# Patient Record
Sex: FEMALE | Race: WHITE | NOT HISPANIC OR LATINO
[De-identification: names, ages, dates, MRNs, and addresses within clinical notes are randomized per-mention and may not be internally consistent; named-entity substitution may affect disease eponyms.]

---

## 2017-01-20 ENCOUNTER — CHARTING TRANS (OUTPATIENT)
Dept: OTHER | Age: 32
End: 2017-01-20

## 2017-01-23 ENCOUNTER — CHARTING TRANS (OUTPATIENT)
Dept: OTHER | Age: 32
End: 2017-01-23

## 2017-04-05 ENCOUNTER — TELEPHONE (OUTPATIENT)
Dept: FAMILY MEDICINE CLINIC | Facility: CLINIC | Age: 32
End: 2017-04-05

## 2017-04-05 RX ORDER — FLUOXETINE HYDROCHLORIDE 20 MG/1
20 CAPSULE ORAL DAILY
Qty: 30 CAPSULE | Refills: 3 | Status: SHIPPED | OUTPATIENT
Start: 2017-04-05 | End: 2017-04-17

## 2017-04-05 NOTE — TELEPHONE ENCOUNTER
Pt informed of Dr Jeanmarie Medel' response below; pt thankful and agrees. Will take fluoxetine as prescribed and f/u 5/15/17 as previously scheduled; will call sooner if any issues with this medication or if no improvement.  Denies further questions/concerns at this ti

## 2017-04-05 NOTE — TELEPHONE ENCOUNTER
Of course we can do that. I will send that into the pharmacy now. Stop the sertraline immediately and start fluoxetine.

## 2017-04-05 NOTE — TELEPHONE ENCOUNTER
Pt reports taking Sertraline since prescribed 3/27/17 by VS at anxiety OV. Reports noticed anxiety increasing since starting even just the half dose of sertraline for a week.  Also having nightmares about dying; has been crying and having panic attacks (tres

## 2017-04-05 NOTE — TELEPHONE ENCOUNTER
Jacek Barrientos is home today because the Sertraline is making her much more anxious, having nightmares, crying, panic attacks. She would like to have Fluoxetine HCL 20mg, her sibling is on that medication.  Please advise      Current Outpatient Prescriptions:  Sertr

## 2017-04-17 ENCOUNTER — TELEPHONE (OUTPATIENT)
Dept: FAMILY MEDICINE CLINIC | Facility: CLINIC | Age: 32
End: 2017-04-17

## 2017-04-17 RX ORDER — FLUOXETINE HYDROCHLORIDE 20 MG/1
20 CAPSULE ORAL 2 TIMES DAILY
Qty: 60 CAPSULE | Refills: 3 | Status: SHIPPED | OUTPATIENT
Start: 2017-04-17 | End: 2017-05-15

## 2017-04-17 NOTE — TELEPHONE ENCOUNTER
Pt stated that for a couple day now she has been taking Fluoxetine 20 mg BID and she feels so much better. In the morning it helps her get through the day and in the evening it helps her relax. Can it be changed to BID?

## 2017-04-17 NOTE — TELEPHONE ENCOUNTER
Pt informed Dr Persaud Seen went ahead and sent rx with BID dosing as requested. Denies further questions/concerns at this time.

## 2017-04-17 NOTE — TELEPHONE ENCOUNTER
Pt calling states she feels her current med dosage is not helping her feel better. Pt requesting to double dosage so she can take twice daily. Pt states she has been taking current med this way and feels better. Please advise.        Current Outpatient Pres

## 2017-04-21 ENCOUNTER — TELEPHONE (OUTPATIENT)
Dept: FAMILY MEDICINE CLINIC | Facility: CLINIC | Age: 32
End: 2017-04-21

## 2017-04-21 NOTE — TELEPHONE ENCOUNTER
Per pt, she increase the dose in taking FLUoxetine HCl 20 MG Oral Cap ahead of time before that's why pt is short now in medicine. When pt went and  her pharmacy stts that it is too soon to  and they told her that it is due on 05/04/2017.   P

## 2017-04-21 NOTE — TELEPHONE ENCOUNTER
Patient was left a detail message on personal voicemail. I will call [facility] to have new rx filled dated 4/17/17; I spoke to pharmacist; will run the claim for 4/17/17 rx. Explained that new SIG. Patient is to take BID.

## 2017-04-21 NOTE — TELEPHONE ENCOUNTER
My understanding is that fluoxetine which is Prozac is a capsule so she cannot cut it in half. I sent 20 mg to take twice daily #60 just recently. She should have more than enough as this was a new prescription.

## 2017-05-15 PROBLEM — F41.9 ANXIETY: Status: ACTIVE | Noted: 2017-05-15

## 2017-05-15 NOTE — PROGRESS NOTES
Patient ID: Jonathan Bolton is a 36year old female. HPI  Patient presents with: Anxiety: follow up     We switched her from sertraline to Prozac as family members were on Prozac and it helped them.   She also started taking 2 a day which made her feel be breastfeeding. Physical Exam   Constitutional: Patient is oriented to person, place, and time. Patient appears well-developed and well-nourished. No distress. HENT:   Head: Normocephalic and atraumatic. Neck: Normal range of motion. Neck supple.  No th

## 2017-08-14 PROBLEM — E66.01 SEVERE OBESITY (BMI 35.0-39.9): Status: ACTIVE | Noted: 2017-08-14

## 2017-08-14 NOTE — PROGRESS NOTES
Patient ID: Roddy Skaggs is a 36year old female. 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 She is on Prozac twice daily. She is not depressed.   She states she is doing wonderful with the [employer] person, place, and time. Patient appears well-developed and well-nourished. No distress. HENT:   Head: Normocephalic and atraumatic. Neck: Normal range of motion. Neck supple. No thyromegaly present. Lymphadenopathy:     Has  no cervical adenopathy.

## 2017-12-11 ENCOUNTER — OFFICE VISIT (OUTPATIENT)
Dept: FAMILY MEDICINE CLINIC | Facility: CLINIC | Age: 32
End: 2017-12-11

## 2017-12-11 VITALS
WEIGHT: 154.19 LBS | RESPIRATION RATE: 12 BRPM | BODY MASS INDEX: 32.37 KG/M2 | TEMPERATURE: 98 F | HEART RATE: 92 BPM | DIASTOLIC BLOOD PRESSURE: 74 MMHG | SYSTOLIC BLOOD PRESSURE: 122 MMHG | HEIGHT: 58 IN

## 2017-12-11 DIAGNOSIS — Z12.4 CERVICAL CANCER SCREENING: ICD-10-CM

## 2017-12-11 DIAGNOSIS — R63.4 WEIGHT LOSS: ICD-10-CM

## 2017-12-11 DIAGNOSIS — F41.9 ANXIETY: ICD-10-CM

## 2017-12-11 DIAGNOSIS — Z00.00 ADULT GENERAL MEDICAL EXAM: Primary | ICD-10-CM

## 2017-12-11 PROCEDURE — 99395 PREV VISIT EST AGE 18-39: CPT | Performed by: FAMILY MEDICINE

## 2017-12-11 RX ORDER — FLUOXETINE HYDROCHLORIDE 20 MG/1
20 CAPSULE ORAL 2 TIMES DAILY
Qty: 180 CAPSULE | Refills: 3 | Status: SHIPPED | OUTPATIENT
Start: 2017-12-11 | End: 2018-12-18

## 2017-12-11 NOTE — PROGRESS NOTES
Patient ID: Regina Marcelino is a 29year old female. HPI  Patient presents with:  Routine Physical    She teaches sixth and eighth grade. She does not smoke. She has a boyfriend now for 2 months. She is long overdue to see her gynecologist Dr. Rohini Tejeda. for adenopathy. Does not bruise/bleed easily. Psychiatric/Behavioral: Negative for dysphoric mood and hallucinations. The patient is not nervous/anxious.           Past Medical History:   Diagnosis Date   • Cholelithiasis     per NG: laproscopic cholecyst and breath sounds normal. No respiratory distress. Abdominal: Soft. Bowel sounds are normal. There is no hepatosplenomegaly. There is no tenderness. Lymphadenopathy:     She has no  cervical adenopathy.    Neurological: She is alert and oriented to pers understands and agrees to plan.      REFUSES FLU SHOT      [facility], DO  12/11/2017

## 2018-01-03 ENCOUNTER — OFFICE VISIT (OUTPATIENT)
Dept: OBGYN CLINIC | Facility: CLINIC | Age: 33
End: 2018-01-03

## 2018-01-03 VITALS
HEART RATE: 98 BPM | BODY MASS INDEX: 33.17 KG/M2 | HEIGHT: 57.75 IN | WEIGHT: 158 LBS | SYSTOLIC BLOOD PRESSURE: 112 MMHG | DIASTOLIC BLOOD PRESSURE: 82 MMHG

## 2018-01-03 DIAGNOSIS — Z12.4 SCREENING FOR MALIGNANT NEOPLASM OF CERVIX: ICD-10-CM

## 2018-01-03 DIAGNOSIS — Z01.419 WELL WOMAN EXAM WITH ROUTINE GYNECOLOGICAL EXAM: Primary | ICD-10-CM

## 2018-01-03 DIAGNOSIS — Z76.0 MEDICATION REFILL: ICD-10-CM

## 2018-01-03 PROCEDURE — 99385 PREV VISIT NEW AGE 18-39: CPT | Performed by: CLINICAL NURSE SPECIALIST

## 2018-01-03 RX ORDER — DROSPIRENONE AND ETHINYL ESTRADIOL 0.02-3(28)
1 KIT ORAL DAILY
Qty: 1 PACKAGE | Refills: 11 | Status: SHIPPED | OUTPATIENT
Start: 2018-01-03 | End: 2019-01-21

## 2018-01-03 NOTE — PROGRESS NOTES
Olga Saucedo is a 29year old female  Patient's last menstrual period was 2017. Patient presents with:  Gyn Exam: annual  Last annual exam was  with CAP. Last pap was  and normal Denies hx of abnormal pap's.  Stopped OCP a few years ago Disease Maternal Grandmother      per NG: kidney failure   • Heart Disease Paternal Grandmother    • Kidney Disease Sister      per NG: kidney stones   • Breast Cancer Other      per NG: mom's cousin       MEDICATIONS:    Current Outpatient Prescriptions: location, without lesions and prolapse  Bladder:  No fullness, masses or tenderness  Vagina:  Normal appearance without lesions, no abnormal discharge  Cervix:  Normal without tenderness on motion  Uterus: normal in size, contour, position, mobility, witho

## 2018-01-04 LAB — HPV I/H RISK 1 DNA SPEC QL NAA+PROBE: NEGATIVE

## 2018-01-06 ENCOUNTER — LAB ENCOUNTER (OUTPATIENT)
Dept: LAB | Facility: HOSPITAL | Age: 33
End: 2018-01-06
Attending: FAMILY MEDICINE
Payer: COMMERCIAL

## 2018-01-06 DIAGNOSIS — R63.4 WEIGHT LOSS: ICD-10-CM

## 2018-01-06 DIAGNOSIS — Z00.00 ADULT GENERAL MEDICAL EXAM: ICD-10-CM

## 2018-01-06 LAB
ALBUMIN SERPL BCP-MCNC: 3.8 G/DL (ref 3.5–4.8)
ALBUMIN/GLOB SERPL: 1.4 {RATIO} (ref 1–2)
ALP SERPL-CCNC: 51 U/L (ref 32–100)
ALT SERPL-CCNC: 19 U/L (ref 14–54)
ANION GAP SERPL CALC-SCNC: 7 MMOL/L (ref 0–18)
AST SERPL-CCNC: 15 U/L (ref 15–41)
BASOPHILS # BLD: 0 K/UL (ref 0–0.2)
BASOPHILS NFR BLD: 0 %
BILIRUB SERPL-MCNC: 0.6 MG/DL (ref 0.3–1.2)
BUN SERPL-MCNC: 12 MG/DL (ref 8–20)
BUN/CREAT SERPL: 20 (ref 10–20)
CALCIUM SERPL-MCNC: 8.9 MG/DL (ref 8.5–10.5)
CHLORIDE SERPL-SCNC: 102 MMOL/L (ref 95–110)
CHOLEST SERPL-MCNC: 164 MG/DL (ref 110–200)
CO2 SERPL-SCNC: 26 MMOL/L (ref 22–32)
CREAT SERPL-MCNC: 0.6 MG/DL (ref 0.5–1.5)
EOSINOPHIL # BLD: 0.1 K/UL (ref 0–0.7)
EOSINOPHIL NFR BLD: 1 %
ERYTHROCYTE [DISTWIDTH] IN BLOOD BY AUTOMATED COUNT: 14.4 % (ref 11–15)
GLOBULIN PLAS-MCNC: 2.8 G/DL (ref 2.5–3.7)
GLUCOSE SERPL-MCNC: 86 MG/DL (ref 70–99)
HCT VFR BLD AUTO: 41.7 % (ref 35–48)
HDLC SERPL-MCNC: 49 MG/DL
HGB BLD-MCNC: 13.7 G/DL (ref 12–16)
LDLC SERPL CALC-MCNC: 87 MG/DL (ref 0–99)
LYMPHOCYTES # BLD: 3.9 K/UL (ref 1–4)
LYMPHOCYTES NFR BLD: 40 %
MCH RBC QN AUTO: 28.9 PG (ref 27–32)
MCHC RBC AUTO-ENTMCNC: 33 G/DL (ref 32–37)
MCV RBC AUTO: 87.6 FL (ref 80–100)
MONOCYTES # BLD: 0.7 K/UL (ref 0–1)
MONOCYTES NFR BLD: 7 %
NEUTROPHILS # BLD AUTO: 5 K/UL (ref 1.8–7.7)
NEUTROPHILS NFR BLD: 52 %
NONHDLC SERPL-MCNC: 115 MG/DL
OSMOLALITY UR CALC.SUM OF ELEC: 279 MOSM/KG (ref 275–295)
PLATELET # BLD AUTO: 382 K/UL (ref 140–400)
PMV BLD AUTO: 6.8 FL (ref 7.4–10.3)
POTASSIUM SERPL-SCNC: 4.2 MMOL/L (ref 3.3–5.1)
PROT SERPL-MCNC: 6.6 G/DL (ref 5.9–8.4)
RBC # BLD AUTO: 4.75 M/UL (ref 3.7–5.4)
SODIUM SERPL-SCNC: 135 MMOL/L (ref 136–144)
TRIGL SERPL-MCNC: 141 MG/DL (ref 1–149)
TSH SERPL-ACNC: 2.4 UIU/ML (ref 0.45–5.33)
VIT B12 SERPL-MCNC: 638 PG/ML (ref 181–914)
WBC # BLD AUTO: 9.8 K/UL (ref 4–11)

## 2018-01-06 PROCEDURE — 36415 COLL VENOUS BLD VENIPUNCTURE: CPT

## 2018-01-06 PROCEDURE — 85025 COMPLETE CBC W/AUTO DIFF WBC: CPT

## 2018-01-06 PROCEDURE — 80061 LIPID PANEL: CPT

## 2018-01-06 PROCEDURE — 80053 COMPREHEN METABOLIC PANEL: CPT

## 2018-01-06 PROCEDURE — 82607 VITAMIN B-12: CPT

## 2018-01-06 PROCEDURE — 82306 VITAMIN D 25 HYDROXY: CPT

## 2018-01-06 PROCEDURE — 84443 ASSAY THYROID STIM HORMONE: CPT

## 2018-01-10 LAB — 25(OH)D3 SERPL-MCNC: 32.4 NG/ML

## 2018-12-18 DIAGNOSIS — F41.9 ANXIETY: ICD-10-CM

## 2018-12-26 NOTE — TELEPHONE ENCOUNTER
Requested Prescriptions     Pending Prescriptions Disp Refills   • FLUoxetine HCl 20 MG Oral Cap 180 capsule 3     Sig: Take 1 capsule (20 mg total) by mouth 2 (two) times daily.        Last Office Visit with PCP: 12/11/17  Last Blood Pressures:  BP Reading

## 2018-12-28 RX ORDER — FLUOXETINE HYDROCHLORIDE 20 MG/1
20 CAPSULE ORAL 2 TIMES DAILY
Qty: 180 CAPSULE | Refills: 0 | Status: SHIPPED | OUTPATIENT
Start: 2018-12-28 | End: 2019-01-21

## 2019-01-21 ENCOUNTER — APPOINTMENT (OUTPATIENT)
Dept: LAB | Age: 34
End: 2019-01-21
Attending: FAMILY MEDICINE
Payer: COMMERCIAL

## 2019-01-21 DIAGNOSIS — R06.83 SNORING: ICD-10-CM

## 2019-01-21 DIAGNOSIS — E66.01 MORBID OBESITY DUE TO EXCESS CALORIES (HCC): ICD-10-CM

## 2019-01-21 PROCEDURE — 93010 ELECTROCARDIOGRAM REPORT: CPT | Performed by: FAMILY MEDICINE

## 2019-01-21 PROCEDURE — 93005 ELECTROCARDIOGRAM TRACING: CPT

## 2019-03-04 ENCOUNTER — OFFICE VISIT (OUTPATIENT)
Dept: FAMILY MEDICINE CLINIC | Facility: CLINIC | Age: 34
End: 2019-03-04
Payer: COMMERCIAL

## 2019-03-04 VITALS
TEMPERATURE: 99 F | RESPIRATION RATE: 18 BRPM | DIASTOLIC BLOOD PRESSURE: 80 MMHG | BODY MASS INDEX: 47.86 KG/M2 | HEART RATE: 88 BPM | SYSTOLIC BLOOD PRESSURE: 118 MMHG | WEIGHT: 228 LBS | HEIGHT: 58 IN

## 2019-03-04 DIAGNOSIS — E28.2 POLYCYSTIC OVARIES: ICD-10-CM

## 2019-03-04 DIAGNOSIS — F41.9 ANXIETY: ICD-10-CM

## 2019-03-04 DIAGNOSIS — Z00.00 ADULT GENERAL MEDICAL EXAM: Primary | ICD-10-CM

## 2019-03-04 DIAGNOSIS — E66.01 MORBID OBESITY DUE TO EXCESS CALORIES (HCC): ICD-10-CM

## 2019-03-04 PROCEDURE — 99395 PREV VISIT EST AGE 18-39: CPT | Performed by: FAMILY MEDICINE

## 2019-03-04 NOTE — PROGRESS NOTES
Patient ID: Roddy Skaggs is a 34year old female. 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 She is getting  around November 2, 2019.  She has not seen Dr. Bro Parrish 01/06/2018    K 4.2 01/06/2018     01/06/2018    CO2 26 01/06/2018       Lab Results   Component Value Date    GLU 86 01/06/2018    BUN 12 01/06/2018    CREATSERUM 0.60 01/06/2018    BUNCREA 20.0 01/06/2018    ANIONGAP 7 01/06/2018    GFRAA >60 01/06 117/82        Review of Systems   Constitutional: Negative for chills and fever. HENT: Negative for voice change. Respiratory: Negative for chest tightness and shortness of breath. Cardiovascular: Negative for chest pain.    Gastrointestinal: [place] Talks on phone: Not on file        Gets together: Not on file        Attends Hindu service: Not on file        Active member of club or organization: Not on file        Attends meetings of clubs or organizations: Not on file        Relationship status: hepatosplenomegaly. There is no tenderness. Lymphadenopathy:     She has no  cervical adenopathy. Neurological: She is alert and oriented to person, place, and time. She has normal reflexes. No cranial nerve deficit. Skin: Skin is warm and dry.  No ra made by the scribe were at my direction and in my presence. I have reviewed the chart and discharge instructions (if applicable) and agree that the record reflects my personal performance and is accurate and complete.   Joe Hall DO, 3/4/2019, 7:15 PM

## 2019-03-09 ENCOUNTER — LAB ENCOUNTER (OUTPATIENT)
Dept: LAB | Facility: HOSPITAL | Age: 34
End: 2019-03-09
Attending: FAMILY MEDICINE
Payer: COMMERCIAL

## 2019-03-09 DIAGNOSIS — Z00.00 ADULT GENERAL MEDICAL EXAM: ICD-10-CM

## 2019-03-09 DIAGNOSIS — E28.2 POLYCYSTIC OVARIES: ICD-10-CM

## 2019-03-09 LAB
ALBUMIN SERPL-MCNC: 3.9 G/DL (ref 3.4–5)
ALBUMIN/GLOB SERPL: 1 {RATIO} (ref 1–2)
ALP LIVER SERPL-CCNC: 64 U/L (ref 37–98)
ALT SERPL-CCNC: 22 U/L (ref 13–56)
ANION GAP SERPL CALC-SCNC: 7 MMOL/L (ref 0–18)
AST SERPL-CCNC: 18 U/L (ref 15–37)
BASOPHILS # BLD AUTO: 0.04 X10(3) UL (ref 0–0.2)
BASOPHILS NFR BLD AUTO: 0.4 %
BILIRUB SERPL-MCNC: 1.1 MG/DL (ref 0.1–2)
BUN BLD-MCNC: 9 MG/DL (ref 7–18)
BUN/CREAT SERPL: 13.6 (ref 10–20)
CALCIUM BLD-MCNC: 8.9 MG/DL (ref 8.5–10.1)
CHLORIDE SERPL-SCNC: 105 MMOL/L (ref 98–107)
CHOLEST SMN-MCNC: 248 MG/DL (ref ?–200)
CO2 SERPL-SCNC: 25 MMOL/L (ref 21–32)
CREAT BLD-MCNC: 0.66 MG/DL (ref 0.55–1.02)
DEPRECATED RDW RBC AUTO: 41.4 FL (ref 35.1–46.3)
DHEA-S SERPL-MCNC: 158 UG/DL
EOSINOPHIL # BLD AUTO: 0.1 X10(3) UL (ref 0–0.7)
EOSINOPHIL NFR BLD AUTO: 1 %
ERYTHROCYTE [DISTWIDTH] IN BLOOD BY AUTOMATED COUNT: 12.8 % (ref 11–15)
GLOBULIN PLAS-MCNC: 3.9 G/DL (ref 2.8–4.4)
GLUCOSE BLD-MCNC: 82 MG/DL (ref 70–99)
HCT VFR BLD AUTO: 44.7 % (ref 35–48)
HDLC SERPL-MCNC: 52 MG/DL (ref 40–59)
HGB BLD-MCNC: 13.8 G/DL (ref 12–16)
IMM GRANULOCYTES # BLD AUTO: 0.02 X10(3) UL (ref 0–1)
IMM GRANULOCYTES NFR BLD: 0.2 %
INSULIN SERPL-ACNC: 7.8 MU/L (ref 3–25)
LDLC SERPL CALC-MCNC: 168 MG/DL (ref ?–100)
LYMPHOCYTES # BLD AUTO: 3.15 X10(3) UL (ref 1–4)
LYMPHOCYTES NFR BLD AUTO: 32.7 %
M PROTEIN MFR SERPL ELPH: 7.8 G/DL (ref 6.4–8.2)
MCH RBC QN AUTO: 27.3 PG (ref 26–34)
MCHC RBC AUTO-ENTMCNC: 30.9 G/DL (ref 31–37)
MCV RBC AUTO: 88.5 FL (ref 80–100)
MONOCYTES # BLD AUTO: 0.77 X10(3) UL (ref 0.1–1)
MONOCYTES NFR BLD AUTO: 8 %
NEUTROPHILS # BLD AUTO: 5.54 X10 (3) UL (ref 1.5–7.7)
NEUTROPHILS # BLD AUTO: 5.54 X10(3) UL (ref 1.5–7.7)
NEUTROPHILS NFR BLD AUTO: 57.7 %
NONHDLC SERPL-MCNC: 196 MG/DL (ref ?–130)
OSMOLALITY SERPL CALC.SUM OF ELEC: 282 MOSM/KG (ref 275–295)
PLATELET # BLD AUTO: 411 10(3)UL (ref 150–450)
POTASSIUM SERPL-SCNC: 4.2 MMOL/L (ref 3.5–5.1)
RBC # BLD AUTO: 5.05 X10(6)UL (ref 3.8–5.3)
SODIUM SERPL-SCNC: 137 MMOL/L (ref 136–145)
TRIGL SERPL-MCNC: 140 MG/DL (ref 30–149)
TSI SER-ACNC: 1.55 MIU/ML (ref 0.36–3.74)
VLDLC SERPL CALC-MCNC: 28 MG/DL (ref 0–30)
WBC # BLD AUTO: 9.6 X10(3) UL (ref 4–11)

## 2019-03-09 PROCEDURE — 84402 ASSAY OF FREE TESTOSTERONE: CPT

## 2019-03-09 PROCEDURE — 82627 DEHYDROEPIANDROSTERONE: CPT

## 2019-03-09 PROCEDURE — 80053 COMPREHEN METABOLIC PANEL: CPT

## 2019-03-09 PROCEDURE — 84403 ASSAY OF TOTAL TESTOSTERONE: CPT

## 2019-03-09 PROCEDURE — 80061 LIPID PANEL: CPT

## 2019-03-09 PROCEDURE — 84443 ASSAY THYROID STIM HORMONE: CPT

## 2019-03-09 PROCEDURE — 36415 COLL VENOUS BLD VENIPUNCTURE: CPT

## 2019-03-09 PROCEDURE — 85025 COMPLETE CBC W/AUTO DIFF WBC: CPT

## 2019-03-09 PROCEDURE — 83525 ASSAY OF INSULIN: CPT

## 2019-03-13 ENCOUNTER — OFFICE VISIT (OUTPATIENT)
Dept: SURGERY | Facility: CLINIC | Age: 34
End: 2019-03-13
Payer: COMMERCIAL

## 2019-03-13 VITALS
HEART RATE: 88 BPM | RESPIRATION RATE: 16 BRPM | DIASTOLIC BLOOD PRESSURE: 80 MMHG | BODY MASS INDEX: 48.07 KG/M2 | OXYGEN SATURATION: 97 % | HEIGHT: 58 IN | WEIGHT: 229 LBS | SYSTOLIC BLOOD PRESSURE: 120 MMHG

## 2019-03-13 DIAGNOSIS — R79.89 LOW VITAMIN D LEVEL: ICD-10-CM

## 2019-03-13 DIAGNOSIS — R63.5 WEIGHT GAIN: Primary | ICD-10-CM

## 2019-03-13 DIAGNOSIS — E28.2 POLYCYSTIC OVARIES: ICD-10-CM

## 2019-03-13 DIAGNOSIS — E53.8 LOW VITAMIN B12 LEVEL: ICD-10-CM

## 2019-03-13 DIAGNOSIS — Z79.899 ENCOUNTER FOR MEDICATION MANAGEMENT: ICD-10-CM

## 2019-03-13 DIAGNOSIS — E66.01 MORBID OBESITY WITH BMI OF 45.0-49.9, ADULT (HCC): ICD-10-CM

## 2019-03-13 PROCEDURE — 99204 OFFICE O/P NEW MOD 45 MIN: CPT | Performed by: NURSE PRACTITIONER

## 2019-03-13 RX ORDER — PHENTERMINE HYDROCHLORIDE 15 MG/1
15 CAPSULE ORAL EVERY MORNING
Qty: 30 CAPSULE | Refills: 1 | Status: SHIPPED | OUTPATIENT
Start: 2019-03-13 | End: 2020-01-02

## 2019-03-13 NOTE — PROGRESS NOTES
[facility] Consultation Note       Date of Consult:  3/13/2019    Patient:  Ralph Rob  :      1985  MRN:      [id number]    Referring Provider: Dr. Garrison Yu       Chief Complaint:  Patient presents with:  Consult  Weight Manage History      Marital status: Single      Spouse name: Not on file      Number of children: Not on file      Years of education: Not on file      Highest education level: Not on file    Occupational History      Not on file    Social Needs      Financial re CHOLECYSTECTOMY  11/2008    per NG: cholelithiasis   • REMOVAL OF OVARIAN CYST(S)  2009    per NG: ovarian cyst; per [employer]:  \"laparoscopic ovarian cystectomy\"   • TONSILLECTOMY         Family History:    Family History   Problem Relation Age of Onset alert, appears stated age and cooperative  Head: Normocephalic, without obvious abnormality, atraumatic  Throat: lips, mucosa, and tongue normal; teeth and gums normal  Neck: no adenopathy, no carotid bruit, no JVD, supple, symmetrical, trachea midline and non-caloric beverages per day. No fruit juices or regular soda. 3. Increase physical activity as discussed above. 4. Increase fruit and vegetable servings to 5-6 per day. Reviewed recent labs with patient. EKG done.    Start phentermine 15 mg caps

## 2019-03-13 NOTE — PATIENT INSTRUCTIONS
Coffee: 1/2 and 1/2, shake in cinnamon for one week. Ideas for structured diet/lifestyle programs:   Mediterranean  Mediterranean diet Shopping List, 8 Simple Steps for Good Health with the Mediterranean Diet  Paleo/Pegan:   Eat Fat. Get Thin.  Dr. Vidal Rank

## 2019-03-14 LAB
TESTOSTERONE, FREE, S: 1.29 NG/DL
TESTOSTERONE, TOTAL, S: 46 NG/DL

## 2019-03-19 ENCOUNTER — OFFICE VISIT (OUTPATIENT)
Dept: ENDOCRINOLOGY CLINIC | Facility: CLINIC | Age: 34
End: 2019-03-19
Payer: COMMERCIAL

## 2019-03-19 VITALS
HEART RATE: 97 BPM | SYSTOLIC BLOOD PRESSURE: 107 MMHG | BODY MASS INDEX: 47 KG/M2 | WEIGHT: 226 LBS | DIASTOLIC BLOOD PRESSURE: 69 MMHG

## 2019-03-19 DIAGNOSIS — E28.2 PCOS (POLYCYSTIC OVARIAN SYNDROME): Primary | ICD-10-CM

## 2019-03-19 DIAGNOSIS — E78.5 DYSLIPIDEMIA: ICD-10-CM

## 2019-03-19 PROCEDURE — 99243 OFF/OP CNSLTJ NEW/EST LOW 30: CPT | Performed by: INTERNAL MEDICINE

## 2019-03-19 NOTE — H&P
New Patient Evaluation - History and Physical    CONSULT - Reason for Visit:  PCOS Requesting Physician: Dr. Henley Croydon:  Patient presents with:  Consult: PCP advised pt to see Endocrinologist due to PCOS.        HISTORY OF PRESENT ILLNESS: Number of children: Not on file      Years of education: Not on file      Highest education level: Not on file    Tobacco Use      Smoking status: Never Smoker      Smokeless tobacco: Never Used    Substance and Sexual Activity      Alcohol use:  Yes adenopathy, thyroid symmetric, not enlarged and no tenderness  LUNGS: clear to auscultation bilaterally, no crackles or wheezing  CARDIOVASCULAR:  regular rate and rhythm, normal S1 and S2  ABDOMEN:  normal bowel sounds, soft, non-distended, non-tender  SK MD

## 2019-04-08 ENCOUNTER — OFFICE VISIT (OUTPATIENT)
Dept: SURGERY | Facility: CLINIC | Age: 34
End: 2019-04-08
Payer: COMMERCIAL

## 2019-04-08 VITALS
WEIGHT: 222 LBS | SYSTOLIC BLOOD PRESSURE: 112 MMHG | DIASTOLIC BLOOD PRESSURE: 74 MMHG | HEART RATE: 96 BPM | RESPIRATION RATE: 18 BRPM | HEIGHT: 58 IN | OXYGEN SATURATION: 96 % | BODY MASS INDEX: 46.6 KG/M2

## 2019-04-08 DIAGNOSIS — E28.2 POLYCYSTIC OVARIES: ICD-10-CM

## 2019-04-08 DIAGNOSIS — E66.01 MORBID OBESITY WITH BMI OF 45.0-49.9, ADULT (HCC): ICD-10-CM

## 2019-04-08 DIAGNOSIS — E53.8 LOW VITAMIN B12 LEVEL: ICD-10-CM

## 2019-04-08 DIAGNOSIS — Z79.899 ENCOUNTER FOR MEDICATION MANAGEMENT: ICD-10-CM

## 2019-04-08 DIAGNOSIS — R79.89 LOW VITAMIN D LEVEL: ICD-10-CM

## 2019-04-08 DIAGNOSIS — L68.0 HIRSUTISM: ICD-10-CM

## 2019-04-08 DIAGNOSIS — Z51.81 ENCOUNTER FOR THERAPEUTIC DRUG MONITORING: Primary | ICD-10-CM

## 2019-04-08 DIAGNOSIS — R63.5 WEIGHT GAIN: ICD-10-CM

## 2019-04-08 PROCEDURE — 99213 OFFICE O/P EST LOW 20 MIN: CPT | Performed by: NURSE PRACTITIONER

## 2019-04-08 RX ORDER — PHENTERMINE HYDROCHLORIDE 15 MG/1
15 CAPSULE ORAL EVERY MORNING
Qty: 30 CAPSULE | Refills: 1 | Status: SHIPPED | OUTPATIENT
Start: 2019-04-08 | End: 2020-01-02

## 2019-04-08 NOTE — PROGRESS NOTES
[address], [place] [address] Dominique Craft  [facility] [address] [facility]  Dept: [phone]       Patient:  Jared Melendez  :      1985  MRN:      [id number]    Chief Complaint:  Patient presents wit 229 lb      Patient Medications:      Current Outpatient Medications:  Phentermine HCl 15 MG Oral Cap Take 1 capsule (15 mg total) by mouth every morning.  Take before breakfast or 1 to 2 hours after breakfast. Disp: 30 capsule Rfl: 1   Phentermine HCl 15 M Service: Not Asked        Blood Transfusions: Not Asked        Caffeine Concern: No        Occupational Exposure: Not Asked        Hobby Hazards: Not Asked        Sleep Concern: Not Asked        Stress Concern: Not Asked        Weight Concern: Not Asked restaurant or fast food meals/week:  1 meal/week, previously 2 meals/week    Food Journal  · Reviewed and Discussed:       · Patient has a Food Journal?: yes   · Patient is reading nutrition labels?   yes  · Average Caloric Intake:   1200  · Average CHO Int midline and thyroid not enlarged, symmetric, no tenderness/mass/nodules  Back: symmetric, no curvature. ROM normal. No CVA tenderness.   Lungs: clear to auscultation bilaterally  Heart: S1, S2 normal, no murmur, click, rub or gallop, regular rate and rhythm surgical weight management. Desires weight loss medications.  She may consider surgery, but as last resort.      Diagnoses and all orders for this visit:    Encounter for therapeutic drug monitoring    Polycystic ovaries    Morbid obesity with BMI of 45.0-4 +crunchy cravings much improved on phentermine, reports more energy. EKG done. BP and HR in range. Continue phentermine 15 mg capsule by mouth daily. LMP: 3/27/19. Condoms.  Natural Planning.       Discussed risks, benefits, rationale, and side effects

## 2019-04-08 NOTE — PATIENT INSTRUCTIONS
Aim for 1-2 lbs weight loss per week. Ideas for structured diet/lifestyle programs:   Mediterranean  Mediterranean diet Shopping List, 8 Simple Steps for Good Health with the Mediterranean Diet  Paleo/Pegan:   Eat Fat. Get Thin. Dr. Gaviota Langley.    Tri

## 2019-04-24 ENCOUNTER — WALK IN (OUTPATIENT)
Dept: URGENT CARE | Age: 34
End: 2019-04-24

## 2019-04-24 DIAGNOSIS — Z11.1 PPD SCREENING TEST: Primary | ICD-10-CM

## 2019-04-24 PROCEDURE — 86580 TB INTRADERMAL TEST: CPT | Performed by: NURSE PRACTITIONER

## 2019-04-26 ENCOUNTER — WALK IN (OUTPATIENT)
Dept: URGENT CARE | Age: 34
End: 2019-04-26

## 2019-04-26 DIAGNOSIS — Z11.1 ENCOUNTER FOR PPD SKIN TEST READING: Primary | ICD-10-CM

## 2019-04-26 LAB
INDURATION: 0 MM (ref 0–?)
SKIN TEST RESULT: NEGATIVE

## 2019-04-26 PROCEDURE — X1094 NO CHARGE VISIT: HCPCS | Performed by: REGISTERED NURSE

## 2020-01-02 ENCOUNTER — OFFICE VISIT (OUTPATIENT)
Dept: OBGYN CLINIC | Facility: CLINIC | Age: 35
End: 2020-01-02
Payer: COMMERCIAL

## 2020-01-02 VITALS
WEIGHT: 249 LBS | DIASTOLIC BLOOD PRESSURE: 91 MMHG | HEART RATE: 118 BPM | SYSTOLIC BLOOD PRESSURE: 133 MMHG | BODY MASS INDEX: 52 KG/M2

## 2020-01-02 DIAGNOSIS — N92.6 IRREGULAR PERIODS: Primary | ICD-10-CM

## 2020-01-02 PROCEDURE — 99212 OFFICE O/P EST SF 10 MIN: CPT | Performed by: OBSTETRICS & GYNECOLOGY

## 2020-01-02 NOTE — PROGRESS NOTES
Regina Marcelino is a 30year old female [place] Patient's last menstrual period was 11/22/2019. Patient presents with:  Gyn Problem: Diamond Libman / Blade Srivastava / Lesly Thompson / Domingo Moses    Seen [facility] in 2018. Menses q month. Winsome Mandujano LMP 11/22/19.   Had light bleeding 12

## 2020-03-19 ENCOUNTER — E-VISIT (OUTPATIENT)
Dept: FAMILY MEDICINE CLINIC | Facility: CLINIC | Age: 35
End: 2020-03-19

## 2020-03-19 DIAGNOSIS — R30.0 DYSURIA: Primary | ICD-10-CM

## 2020-03-19 RX ORDER — NITROFURANTOIN 25; 75 MG/1; MG/1
100 CAPSULE ORAL 2 TIMES DAILY
Qty: 14 CAPSULE | Refills: 0 | Status: SHIPPED | OUTPATIENT
Start: 2020-03-19 | End: 2020-03-26

## 2020-05-04 ENCOUNTER — E-VISIT (OUTPATIENT)
Dept: FAMILY MEDICINE CLINIC | Facility: CLINIC | Age: 35
End: 2020-05-04

## 2020-05-04 DIAGNOSIS — N76.0 LABIAL INFECTION: Primary | ICD-10-CM

## 2020-05-04 PROCEDURE — 99421 OL DIG E/M SVC 5-10 MIN: CPT | Performed by: NURSE PRACTITIONER

## 2020-05-04 RX ORDER — SULFAMETHOXAZOLE AND TRIMETHOPRIM 800; 160 MG/1; MG/1
1 TABLET ORAL 2 TIMES DAILY
Qty: 14 TABLET | Refills: 0 | Status: SHIPPED | OUTPATIENT
Start: 2020-05-04 | End: 2020-05-11

## 2020-05-04 NOTE — PROGRESS NOTES
Barak Lin is a 30year old female. HPI:   See answers to questions above.      Current Outpatient Medications   Medication Sig Dispense Refill   • Sulfamethoxazole-TMP DS (BACTRIM DS) 800-160 MG Oral Tab per tablet Take 1 tablet by mouth 2 (two) times d

## 2020-05-07 ENCOUNTER — TELEPHONE (OUTPATIENT)
Dept: OBGYN CLINIC | Facility: CLINIC | Age: 35
End: 2020-05-07

## 2020-05-07 ENCOUNTER — E-VISIT (OUTPATIENT)
Dept: FAMILY MEDICINE CLINIC | Facility: CLINIC | Age: 35
End: 2020-05-07

## 2020-05-07 DIAGNOSIS — Z02.9 ENCOUNTERS FOR ADMINISTRATIVE PURPOSES: Primary | ICD-10-CM

## 2020-05-07 NOTE — TELEPHONE ENCOUNTER
PER PATIENT REQUESTED AN APPOINTMENT THRU MY-CHART / THE APPT WAS CANCELLED / PT STATE SHE HAS VAGINAL BURNING / Noé Boothe / Femi Calloway

## 2020-05-08 ENCOUNTER — HOSPITAL ENCOUNTER (OUTPATIENT)
Age: 35
Discharge: HOME | End: 2020-05-08
Attending: EMERGENCY MEDICINE
Payer: COMMERCIAL

## 2020-05-08 VITALS
RESPIRATION RATE: 18 BRPM | DIASTOLIC BLOOD PRESSURE: 93 MMHG | HEART RATE: 115 BPM | OXYGEN SATURATION: 100 % | SYSTOLIC BLOOD PRESSURE: 139 MMHG | TEMPERATURE: 99 F

## 2020-05-08 DIAGNOSIS — B37.3 VAGINAL YEAST INFECTION: Primary | ICD-10-CM

## 2020-05-08 PROCEDURE — 87591 N.GONORRHOEAE DNA AMP PROB: CPT | Performed by: EMERGENCY MEDICINE

## 2020-05-08 PROCEDURE — 87491 CHLMYD TRACH DNA AMP PROBE: CPT | Performed by: EMERGENCY MEDICINE

## 2020-05-08 PROCEDURE — 99214 OFFICE O/P EST MOD 30 MIN: CPT

## 2020-05-08 PROCEDURE — 99204 OFFICE O/P NEW MOD 45 MIN: CPT

## 2020-05-08 PROCEDURE — 81025 URINE PREGNANCY TEST: CPT

## 2020-05-08 PROCEDURE — 81002 URINALYSIS NONAUTO W/O SCOPE: CPT

## 2020-05-08 PROCEDURE — 87205 SMEAR GRAM STAIN: CPT | Performed by: EMERGENCY MEDICINE

## 2020-05-08 PROCEDURE — 87808 TRICHOMONAS ASSAY W/OPTIC: CPT | Performed by: EMERGENCY MEDICINE

## 2020-05-08 PROCEDURE — 87106 FUNGI IDENTIFICATION YEAST: CPT | Performed by: EMERGENCY MEDICINE

## 2020-05-08 RX ORDER — FLUCONAZOLE 150 MG/1
150 TABLET ORAL ONCE
Qty: 1 TABLET | Refills: 1 | Status: SHIPPED | OUTPATIENT
Start: 2020-05-08 | End: 2020-05-08

## 2020-05-08 NOTE — ED INITIAL ASSESSMENT (HPI)
Pt here with complaints of vaginal pain and swelling that started 1 week ago, pt called her md and was prescribed bactrim , pt has not have any relieve and is now having and odorous discharge that started today,

## 2020-05-08 NOTE — ED PROVIDER NOTES
Patient Seen in: [facility]      History   Patient presents with:  Ja    Stated Complaint: Vaginal pain discharge     HPI    Patient is a 28-year-old female who presents to immediate care complaining of vaginal irrita pain. Negative for decreased urine volume, dysuria, flank pain, frequency, genital sores, hematuria, menstrual problem, urgency and vaginal bleeding. Musculoskeletal: Negative. Skin: Negative for rash.        Positive for stated complaint: Vaginal pain Notable for the following components:       Result Value    Urine Clarity Cloudy (*)     Blood, Urine Moderate (*)     All other components within normal limits   [facility] POCT PREGNANCY URINE - Normal   CHLAMYDIA/GONOCOCCUS, YULI   GENITAL VAGINOSIS SCREEN

## 2020-06-02 ENCOUNTER — E-VISIT (OUTPATIENT)
Dept: FAMILY MEDICINE CLINIC | Facility: CLINIC | Age: 35
End: 2020-06-02

## 2020-06-02 DIAGNOSIS — R39.9 SYMPTOMS OF URINARY TRACT INFECTION: Primary | ICD-10-CM

## 2020-06-02 PROCEDURE — 99421 OL DIG E/M SVC 5-10 MIN: CPT | Performed by: NURSE PRACTITIONER

## 2020-06-02 RX ORDER — NITROFURANTOIN 25; 75 MG/1; MG/1
CAPSULE ORAL
Qty: 14 CAPSULE | Refills: 0 | Status: SHIPPED | OUTPATIENT
Start: 2020-06-02 | End: 2021-08-02

## 2020-06-02 NOTE — PROGRESS NOTES
Jc Corey is a 30year old female. HPI:   See answers to questions above.      Current Outpatient Medications   Medication Sig Dispense Refill   • Nitrofurantoin Monohyd Macro 100 MG Oral Cap Take one capsule 2 times daily for 7 days 14 capsule 0

## 2020-06-11 ENCOUNTER — OFFICE VISIT (OUTPATIENT)
Dept: OBGYN CLINIC | Facility: CLINIC | Age: 35
End: 2020-06-11
Payer: COMMERCIAL

## 2020-06-11 VITALS — BODY MASS INDEX: 55 KG/M2 | DIASTOLIC BLOOD PRESSURE: 76 MMHG | SYSTOLIC BLOOD PRESSURE: 118 MMHG | WEIGHT: 263 LBS

## 2020-06-11 DIAGNOSIS — I86.3 VULVAR VARICOSE VEINS: Primary | ICD-10-CM

## 2020-06-11 PROCEDURE — 99213 OFFICE O/P EST LOW 20 MIN: CPT | Performed by: CLINICAL NURSE SPECIALIST

## 2020-06-11 NOTE — PROGRESS NOTES
Purvi Block is a 30year old female  Patient's last menstrual period was 2020 (approximate).  Patient presents with:  Gyn Problem: Pt says that she has some vaginal swelling on the right side only, says that she had a yeast infection about 1 on file        Attends Episcopal service: Not on file        Active member of club or organization: Not on file        Attends meetings of clubs or organizations: Not on file        Relationship status: Not on file      Intimate partner violence:        Fe Prescriptions      No prescriptions requested or ordered in this encounter       Area of concern looks like bulging vulvar vein. Discussed varicosities and made aware that this can be a result of more pressure on the pelvis.   Discussed how her weight darwin

## 2020-07-14 ENCOUNTER — TELEPHONE (OUTPATIENT)
Dept: ENDOCRINOLOGY CLINIC | Facility: CLINIC | Age: 35
End: 2020-07-14

## 2020-07-14 NOTE — TELEPHONE ENCOUNTER
Received fax from [employer] requesting additional information prior to determining patient's liability. Placed on MD desk for completion.

## 2020-11-25 ENCOUNTER — TELEPHONE (OUTPATIENT)
Dept: OBGYN CLINIC | Facility: CLINIC | Age: 35
End: 2020-11-25

## 2020-11-25 NOTE — TELEPHONE ENCOUNTER
Pt states her weight is likely about the same as her last weight in the chart which is around 260 pounds making her BMI greater than 45. So info given for high risk groups and pt will call them. Appt cancelled with [facility].

## 2020-11-25 NOTE — TELEPHONE ENCOUNTER
GOKUL and noted that her appt with [facility] was booked incorrectly and we need to speak to her before the appt. Need to find out how much she weighs. It's possible her BMI is 45 or more so will need to see high risk.   [phone], Harley Rodriguez

## 2021-01-04 ENCOUNTER — OFFICE VISIT (OUTPATIENT)
Dept: OBGYN CLINIC | Facility: CLINIC | Age: 36
End: 2021-01-04
Payer: COMMERCIAL

## 2021-01-04 VITALS
SYSTOLIC BLOOD PRESSURE: 138 MMHG | HEIGHT: 57.5 IN | HEART RATE: 111 BPM | DIASTOLIC BLOOD PRESSURE: 86 MMHG | WEIGHT: 262 LBS | BODY MASS INDEX: 55.75 KG/M2

## 2021-01-04 DIAGNOSIS — Z01.419 WELL WOMAN EXAM WITH ROUTINE GYNECOLOGICAL EXAM: Primary | ICD-10-CM

## 2021-01-04 DIAGNOSIS — Z30.09 FAMILY PLANNING EDUCATION, GUIDANCE, AND COUNSELING: ICD-10-CM

## 2021-01-04 DIAGNOSIS — Z00.00 PERIODIC HEALTH ASSESSMENT, GENERAL SCREENING, ADULT: ICD-10-CM

## 2021-01-04 DIAGNOSIS — L68.0 FEMALE HIRSUTISM: ICD-10-CM

## 2021-01-04 PROCEDURE — 99395 PREV VISIT EST AGE 18-39: CPT | Performed by: CLINICAL NURSE SPECIALIST

## 2021-01-04 PROCEDURE — 99213 OFFICE O/P EST LOW 20 MIN: CPT | Performed by: CLINICAL NURSE SPECIALIST

## 2021-01-04 PROCEDURE — 3008F BODY MASS INDEX DOCD: CPT | Performed by: CLINICAL NURSE SPECIALIST

## 2021-01-04 PROCEDURE — 3075F SYST BP GE 130 - 139MM HG: CPT | Performed by: CLINICAL NURSE SPECIALIST

## 2021-01-04 PROCEDURE — 3079F DIAST BP 80-89 MM HG: CPT | Performed by: CLINICAL NURSE SPECIALIST

## 2021-01-07 NOTE — PROGRESS NOTES
Julia Mclain is a 37year old female  Patient's last menstrual period was 2020. Patient presents with:  Gyn Exam: ANNUAL EXAM  Last annual exam and pap was 2018. Pap was normal, HPV negative. Denies hx of abnormal pap's.  Periods are every 28 Inability: Not on file      Transportation needs        Medical: Not on file        Non-medical: Not on file    Tobacco Use      Smoking status: Never Smoker      Smokeless tobacco: Never Used    Substance and Sexual Activity      Alcohol use:  Yes mom's cousin       MEDICATIONS:    Current Outpatient Medications:   •  Nitrofurantoin Monohyd Macro 100 MG Oral Cap, Take one capsule 2 times daily for 7 days, Disp: 14 capsule, Rfl: 0    ALLERGIES:  No Known Allergies      Review of Systems:  Kayley Normal without tenderness on motion  Uterus: normal in size, contour, position, mobility, without tenderness  Adnexa: normal without masses or tenderness  Perineum: normal  Anus: no hemorroids     Assessment & Plan:  Trae Flaco was seen today for gyn exam.    D

## 2021-01-09 ENCOUNTER — LAB ENCOUNTER (OUTPATIENT)
Dept: LAB | Facility: HOSPITAL | Age: 36
End: 2021-01-09
Attending: CLINICAL NURSE SPECIALIST
Payer: COMMERCIAL

## 2021-01-09 DIAGNOSIS — L68.0 FEMALE HIRSUTISM: ICD-10-CM

## 2021-01-09 DIAGNOSIS — Z30.09 FAMILY PLANNING EDUCATION, GUIDANCE, AND COUNSELING: ICD-10-CM

## 2021-01-09 DIAGNOSIS — Z00.00 PERIODIC HEALTH ASSESSMENT, GENERAL SCREENING, ADULT: ICD-10-CM

## 2021-01-09 LAB
ESTRADIOL SERPL-MCNC: 34.7 PG/ML
FSH SERPL-ACNC: 4.3 MIU/ML
LH SERPL-ACNC: 2.1 MIU/ML
T4 FREE SERPL-MCNC: 1 NG/DL (ref 0.8–1.7)
TSI SER-ACNC: 1.74 MIU/ML (ref 0.36–3.74)

## 2021-01-09 PROCEDURE — 83002 ASSAY OF GONADOTROPIN (LH): CPT

## 2021-01-09 PROCEDURE — 83520 IMMUNOASSAY QUANT NOS NONAB: CPT

## 2021-01-09 PROCEDURE — 84439 ASSAY OF FREE THYROXINE: CPT

## 2021-01-09 PROCEDURE — 36415 COLL VENOUS BLD VENIPUNCTURE: CPT

## 2021-01-09 PROCEDURE — 83001 ASSAY OF GONADOTROPIN (FSH): CPT

## 2021-01-09 PROCEDURE — 84402 ASSAY OF FREE TESTOSTERONE: CPT

## 2021-01-09 PROCEDURE — 82670 ASSAY OF TOTAL ESTRADIOL: CPT

## 2021-01-09 PROCEDURE — 84443 ASSAY THYROID STIM HORMONE: CPT

## 2021-01-09 PROCEDURE — 84403 ASSAY OF TOTAL TESTOSTERONE: CPT

## 2021-01-11 ENCOUNTER — PATIENT MESSAGE (OUTPATIENT)
Dept: OBGYN CLINIC | Facility: CLINIC | Age: 36
End: 2021-01-11

## 2021-01-11 NOTE — TELEPHONE ENCOUNTER
From: Corrine Huggins  To: NGUYEN Howell  Sent: 1/11/2021 9:09 AM CST  Subject: Test Results Question    I have a question about [address] resulted on 1/9/21, 2:34 PM.   I am taking a prenatal vitamin that has biotin in it.  Could that be why the results are

## 2021-01-12 LAB — ANTI-MULLERIAN HORMONE: 8.42 NG/ML

## 2021-01-13 LAB
TESTOSTERONE, FREE, S: 0.53 NG/DL
TESTOSTERONE, TOTAL, S: 33 NG/DL

## 2021-03-13 DIAGNOSIS — Z23 NEED FOR VACCINATION: ICD-10-CM

## 2021-07-21 ENCOUNTER — TELEPHONE (OUTPATIENT)
Dept: OBGYN CLINIC | Facility: CLINIC | Age: 36
End: 2021-07-21

## 2021-07-21 NOTE — TELEPHONE ENCOUNTER
Consult note dated 5/22/21 from Dr Shea Garcia at [facility]. Note signed by [facility] and sent to scanning.

## 2021-08-02 ENCOUNTER — OFFICE VISIT (OUTPATIENT)
Dept: FAMILY MEDICINE CLINIC | Facility: CLINIC | Age: 36
End: 2021-08-02
Payer: COMMERCIAL

## 2021-08-02 VITALS
HEIGHT: 57.5 IN | HEART RATE: 88 BPM | SYSTOLIC BLOOD PRESSURE: 120 MMHG | BODY MASS INDEX: 51.92 KG/M2 | TEMPERATURE: 99 F | DIASTOLIC BLOOD PRESSURE: 84 MMHG | WEIGHT: 244 LBS

## 2021-08-02 DIAGNOSIS — E66.01 MORBID OBESITY DUE TO EXCESS CALORIES (HCC): ICD-10-CM

## 2021-08-02 DIAGNOSIS — R06.83 SNORING: ICD-10-CM

## 2021-08-02 DIAGNOSIS — E28.2 PCOS (POLYCYSTIC OVARIAN SYNDROME): ICD-10-CM

## 2021-08-02 DIAGNOSIS — N97.0 INFERTILITY, ANOVULATION: Primary | ICD-10-CM

## 2021-08-02 PROCEDURE — 3008F BODY MASS INDEX DOCD: CPT | Performed by: FAMILY MEDICINE

## 2021-08-02 PROCEDURE — 3079F DIAST BP 80-89 MM HG: CPT | Performed by: FAMILY MEDICINE

## 2021-08-02 PROCEDURE — 99214 OFFICE O/P EST MOD 30 MIN: CPT | Performed by: FAMILY MEDICINE

## 2021-08-02 PROCEDURE — 3074F SYST BP LT 130 MM HG: CPT | Performed by: FAMILY MEDICINE

## 2021-08-02 NOTE — PROGRESS NOTES
Patient ID: Nola Buck is a 37year old female. HPI  Patient presents with: Other: fertility treatment clearance    Last seen by me on 3/4/2019. Pt got  on 11/2/2019. 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     Pt presents today for medi CHOLECYSTECTOMY  11/2008    per NG: cholelithiasis   • REMOVAL OF OVARIAN CYST(S)  2009    per NG: ovarian cyst; per [employer]:  \"laparoscopic ovarian cystectomy\"   • TONSILLECTOMY            Current Outpatient Medications   Medication Sig Dispense Refill REFERRAL HOME SLEEP APNEA TEST    Snoring  -     OP [facility] ALT REFERRAL HOME SLEEP APNEA TEST        Referrals (if applicable)  Orders Placed This Encounter       [facility]  [address] TEST          Referral Priority:Routine          Referral Ty

## 2024-03-01 NOTE — PROGRESS NOTES
Ness Aguilar is a 38 year old female.  HPI:   See answers to questions above.     Current Outpatient Medications   Medication Sig Dispense Refill    METFORMIN & DIET MANAGE PROD  mg 2 (two) times a day.          Past Medical History:   Diagnosis Date    Morbid obesity with BMI of 45.0-49.9, adult (HCC)     Nephrolithiasis     per NG: passed on own- 04/2010      Past Surgical History:   Procedure Laterality Date    LAPAROSCOPIC CHOLECYSTECTOMY  11/2008    per NG: cholelithiasis    REMOVAL OF OVARIAN CYST(S)  2009    per NG: ovarian cyst; per Next[employer]:  \"laparoscopic ovarian cystectomy\"    TONSILLECTOMY        Family History   Problem Relation Age of Onset    Kidney Disease Father         per NG: kidney stones    Kidney Disease Mother         per NG: kidney stones    Renal Disease Maternal Grandmother         per NG: kidney failure    Heart Disease Paternal Grandmother     Kidney Disease Sister         per NG: kidney stones    Breast Cancer Other         per NG: mom's cousin      Social History:  Social History     Socioeconomic History    Marital status:    Tobacco Use    Smoking status: Never    Smokeless tobacco: Never   Vaping Use    Vaping Use: Never used   Substance and Sexual Activity    Alcohol use: Yes     Comment: (beer) 1 drink, socially    Drug use: No    Sexual activity: Yes     Partners: Male     Comment: same partner   Other Topics Concern    Caffeine Concern No         ASSESSMENT AND PLAN:     Encounter Diagnosis   Name Primary?    Viral sinusitis Yes       Discussed supportive care measures, likely viral at this time. May try Afrin for no more than 3 days.    Meds & Refills for this Visit:  Requested Prescriptions      No prescriptions requested or ordered in this encounter       Duration of  the service:  6 minutes    Patient advised to follow up with PCP if no improvement or worsening of symptoms  Refer to [employer] message for specific patient instructions

## 2024-03-04 NOTE — PROGRESS NOTES
Ness Aguilar is a 38 year old female.  HPI:   See answers to questions above.     Current Outpatient Medications   Medication Sig Dispense Refill    ofloxacin 0.3 % Ophthalmic Solution Place 1 drop into both eyes 4 (four) times daily for 7 days. 10 mL 0    METFORMIN & DIET MANAGE PROD  mg 2 (two) times a day.          Past Medical History:   Diagnosis Date    Morbid obesity with BMI of 45.0-49.9, adult (HCC)     Nephrolithiasis     per NG: passed on own- 04/2010      Past Surgical History:   Procedure Laterality Date    LAPAROSCOPIC CHOLECYSTECTOMY  11/2008    per NG: cholelithiasis    REMOVAL OF OVARIAN CYST(S)  2009    per NG: ovarian cyst; per [employer]:  \"laparoscopic ovarian cystectomy\"    TONSILLECTOMY        Family History   Problem Relation Age of Onset    Kidney Disease Father         per NG: kidney stones    Kidney Disease Mother         per NG: kidney stones    Renal Disease Maternal Grandmother         per NG: kidney failure    Heart Disease Paternal Grandmother     Kidney Disease Sister         per NG: kidney stones    Breast Cancer Other         per NG: mom's cousin      Social History:  Social History     Socioeconomic History    Marital status:    Tobacco Use    Smoking status: Never    Smokeless tobacco: Never   Vaping Use    Vaping Use: Never used   Substance and Sexual Activity    Alcohol use: Yes     Comment: (beer) 1 drink, socially    Drug use: No    Sexual activity: Yes     Partners: Male     Comment: same partner   Other Topics Concern    Caffeine Concern No         ASSESSMENT AND PLAN:     Encounter Diagnoses   Name Primary?    Mucopurulent conjunctivitis of both eyes Yes    Wears contact lenses            Meds & Refills for this Visit:  Requested Prescriptions     Signed Prescriptions Disp Refills    ofloxacin 0.3 % Ophthalmic Solution 10 mL 0     Sig: Place 1 drop into both eyes 4 (four) times daily for 7 days.       Duration of  the service:  8 minutes    Patient advised to  follow up with PCP if no improvement or worsening of symptoms  Refer to MyChart message for specific patient instructions

## 2024-08-21 NOTE — PROGRESS NOTES
HPI    Virtual Telephone/Video Check-In    Ness Aguilar verbally consents to a Virtual/Telephone Check-In visit on 08/21/24.  Patient has been referred to the [facility] website at [email or web address] to review the yearly Consent to Treat document.    Patient understands and accepts financial responsibility for any deductible, co-insurance and/or co-pays associated with this service.    Duration of the service: 6 minutes      Summary of topics discussed:     Patient presents to discuss PCOS and weight gain.  Is starting to consider having another baby and is considering going through fertility treatments again in the beginning of the year if unsuccessful getting pregnant naturally.  Would like to restart metformin since she had success losing weight in the past while taking it prior to getting pregnant.    Review of Systems   Constitutional:  Positive for unexpected weight change.        There were no vitals filed for this visit.  There is no height or weight on file to calculate BMI.    Health Maintenance   Topic Date Due    Annual Physical  Never done    COVID-19 Vaccine (4 - 2023-24 season) 09/01/2023    Annual Depression Screening  01/01/2024    Pap Smear  02/18/2025    Influenza Vaccine (1) 10/01/2024    DTaP,Tdap,and Td Vaccines (7 - Td or Tdap) 08/01/2026    Pneumococcal Vaccine: Birth to 64yrs  Aged Out       No LMP recorded.    Past Medical History:    Morbid obesity with BMI of 45.0-49.9, adult (HCC)    Nephrolithiasis    per NG: passed on own- 04/2010       .  Past Surgical History:   Procedure Laterality Date    Laparoscopic cholecystectomy  11/2008    per NG: cholelithiasis    Removal of ovarian cyst(s)  2009    per NG: ovarian cyst; per [employer]:  \"laparoscopic ovarian cystectomy\"    Tonsillectomy         Family History   Problem Relation Age of Onset    Kidney Disease Father         per NG: kidney stones    Kidney Disease Mother         per NG: kidney stones    Renal Disease Maternal Grandmother          per NG: kidney failure    Heart Disease Paternal Grandmother     Kidney Disease Sister         per NG: kidney stones    Breast Cancer Other         per NG: mom's cousin       Social History     Socioeconomic History    Marital status:      Spouse name: Not on file    Number of children: Not on file    Years of education: Not on file    Highest education level: Not on file   Occupational History    Not on file   Tobacco Use    Smoking status: Never    Smokeless tobacco: Never   Vaping Use    Vaping status: Never Used   Substance and Sexual Activity    Alcohol use: Yes     Comment: (beer) 1 drink, socially    Drug use: No    Sexual activity: Yes     Partners: Male     Comment: same partner   Other Topics Concern     Service Not Asked    Blood Transfusions Not Asked    Caffeine Concern No    Occupational Exposure Not Asked    Hobby Hazards Not Asked    Sleep Concern Not Asked    Stress Concern Not Asked    Weight Concern Not Asked    Special Diet Not Asked    Back Care Not Asked    Exercise Not Asked    Bike Helmet Not Asked    Seat Belt Not Asked    Self-Exams Not Asked   Social History Narrative    Not on file     Social Determinants of Health     Financial Resource Strain: Not At Risk (9/28/2022)    [facility]    Financial Resource Strain     How hard is it for you to pay for the very basics like food, housing, medical care, and heating?: Not hard at all   Food Insecurity: No Food Insecurity (3/31/2023)    [facility], [facility]    Food Insecurity     Currently or in the past 3 months, have you worried your food would run out before you had money to buy more?: No     In the past 12 months, have you run out of food or been unable to get more?: No   Transportation Needs: No Transportation Needs (3/31/2023)    [facility] from [facility], [facility]    Transportation Needs     Currently or in the past 3 months, has lack of transportation kept you from medical appointments, getting food or medicine, or providing care to a family member?: Not on file     : Not on file     Medical Transportation Needs?: No     Daily Living Transportation Needs? [Peds Only] : Not on file   Physical Activity: Not on file   Stress: Not on file   Social Connections: Not At Risk (9/28/2022)    [facility]    Social Connections     In a typical week, how many times do you talk on the phone with family, friends, or neighbors?: More than three times a week   Housing Stability: Low Risk  (3/24/2024)    Received from [facility]    Housing Stability     Mortgage Payment Concerns?: Not on file     Number of Places Lived in the Last Year: Not on file     Unstable Housing?: Not on file       Current Outpatient Medications   Medication Sig Dispense Refill    metFORMIN 500 MG Oral Tab Take 1 tablet (500 mg total) by mouth 2 (two) times daily with meals. 180 tablet 1       Allergies:  No Known Allergies    Physical Exam  Pulmonary:      Effort: No respiratory distress.   Neurological:      Mental Status: She is alert and oriented to person, place, and time.          Assessment and Plan:  Problem List Items Addressed This Visit          HCC Problems    Morbid obesity with BMI of 45.0-49.9, adult (HCC)    Relevant Medications    metFORMIN 500 MG Oral Tab       Endocrine and Metabolic    Polycystic ovaries - Primary    Relevant Medications    metFORMIN 500 MG Oral Tab        To restart metformin twice daily.  Follow-up in 6 months.     Discussed plan of care with patient and patient is in agreement.  All questions answered. Patient to call with questions or concerns.    Encouraged to sign up for My Chart if not already registered.

## 2025-05-15 NOTE — PATIENT INSTRUCTIONS
Eustachian tube problems  Written by the doctors and editors at [facility]     What is the eustachian tube? -- The eustachian tube is a tube that connects the middle ear (the part of the ear behind the eardrum) to the back of the nose and throat       Normally, the eustachian tube helps keep the air pressure inside the middle ear the same as the air pressure outside the middle ear. If there is a problem with the eustachian tube, the air pressure inside the middle ear won’t be the same as the air pressure outside it. This can damage the middle ear and cause ear pain, hearing loss, and other symptoms. “Ear barotrauma” is the medical term for when people have symptoms or damage in the middle ear because of air pressure differences.    Most eustachian tube problems are not serious. They usually last only a short time and get better on their own.    But eustachian tube problems sometimes lead to serious problems, such as:    ?A middle ear infection  ?A torn eardrum  ?Hearing loss  Long-term hearing loss from eustachian tube problems can also lead to language or speech problems in children.    What causes eustachian tube problems? -- Common causes of eustachian tube problems are:    ?Illnesses or conditions that make the eustachian tubes swollen or inflamed - These include colds, allergies, ear infections, or sinus infections. The sinuses are hollow areas in the bones of the face.  ?Sudden air pressure changes - Sudden air pressure changes can happen when people fly in an airplane, scuba dive, or drive in the mountains.  ?Growths that block the eustachian tube  ?Being born with an abnormal eustachian tube    What are the symptoms of a eustachian tube problem? -- Common symptoms of a eustachian tube problem include:    ?Ear pain  ?Feeling pressure or fullness in the ear  ?Trouble hearing  ?Ringing in the ear  ?Feeling dizzy  Should I see a doctor or nurse? -- See your doctor or nurse if your symptoms are severe, get worse,  or if they don’t go away after a few days.    Will I need tests? -- Probably not. Your doctor or nurse should be able to tell if you have a eustachian tube problem by learning about your symptoms and doing an exam.    If your symptoms are severe or last for a long time, your doctor or nurse might:    ?Have you see a special kind of doctor called an ear, nose, and throat doctor  ?Do tests to check your hearing  ?Do an imaging test - Imaging tests can create pictures of the inside of the body.    How are eustachian tube problems treated? -- Treatment depends on what’s causing the eustachian tube problem. Depending on your individual situation, your doctor might treat you with one or more of the following:    ?Nose sprays   -Nasal steroids such as Flonase or Nasonex   - OTC nasal sprays like Afrin (oxymetazoline) can be used every 12 hours for NO MORE than 3 days.  Longer use leads to worsening congestion and blood pressure issues. This medication should be avoided in patients with high blood pressure    ?Antihistamines - These medicines are usually used to treat allergies. They help stop itching, sneezing, and runny nose symptoms. Examples: Claritin, Allegra, Zyrtec. (generics are okay to use)    ?Decongestants - These medicines can help with stuffy nose symptoms. These should not be used if patient has underlying blood pressure issues (high blood pressure etc.)   -Sudafed (pseudoephedrine)- according to package instructions.     ?Antibiotic medicines - Antibiotics are not needed to treat eustachian tube problems. But if you have an infection caused by bacteria in addition to your eustachian tube problems, your doctor can treat it with antibiotics.

## 2025-05-15 NOTE — PROGRESS NOTES
Ness Aguilar is a 39 year old female who initiated e-visit care today.    HPI:   See answers to questionnaire submission     Current Medications[1]   Past Medical History[2]   Past Surgical History[3]   Family History[4]   Social History:  Short Social Hx on File[5]      ASSESSMENT AND PLAN:       Diagnoses and all orders for this visit:    Pressure sensation in left ear    Nasal congestion       Discussed ETD vs AOM.  Comfort care for ETD discussed. Advised needs to be seen in-person for increasing pain so AOM can be ruled out.     Duration of  the service:  6 minutes      See [employer] message exchange and Patient Instructions for Comfort Care and patient education.          [1]   Current Outpatient Medications   Medication Sig Dispense Refill    metFORMIN 500 MG Oral Tab Take 1 tablet (500 mg total) by mouth 2 (two) times daily with meals. 180 tablet 1   [2]   Past Medical History:   Morbid obesity with BMI of 45.0-49.9, adult (HCC)    Nephrolithiasis    per NG: passed on own- 04/2010   [3]   Past Surgical History:  Procedure Laterality Date    Laparoscopic cholecystectomy  11/2008    per NG: cholelithiasis    Removal of ovarian cyst(s)  2009    per NG: ovarian cyst; per [employer]:  \"laparoscopic ovarian cystectomy\"    Tonsillectomy     [4]   Family History  Problem Relation Age of Onset    Kidney Disease Father         per NG: kidney stones    Kidney Disease Mother         per NG: kidney stones    Renal Disease Maternal Grandmother         per NG: kidney failure    Heart Disease Paternal Grandmother     Kidney Disease Sister         per NG: kidney stones    Breast Cancer Other         per NG: mom's cousin   [5]   Social History  Socioeconomic History    Marital status:    Tobacco Use    Smoking status: Never    Smokeless tobacco: Never   Vaping Use    Vaping status: Never Used   Substance and Sexual Activity    Alcohol use: Yes     Comment: (beer) 1 drink, socially    Drug use: No    Sexual activity: Yes      Partners: Male     Comment: same partner   Other Topics Concern    Caffeine Concern No     Social Drivers of Health     Food Insecurity: No Food Insecurity (3/31/2023)    Received from [facility]    Food Insecurity     Currently or in the past 3 months, have you worried your food would run out before you had money to buy more?: No     In the past 12 months, have you run out of food or been unable to get more?: No   Transportation Needs: No Transportation Needs (3/31/2023)    Received from [facility]    Transportation Needs     Currently or in the past 3 months, has lack of transportation kept you from medical appointments, getting food or medicine, or providing care to a family member?: No     Medical Transportation Needs?: No    Received from [facility]    Housing Stability

## 2025-07-08 NOTE — PROGRESS NOTES
Patient ID: Ness Aguilar is a 39 year old female.         The following individual(s) verbally consented to be recorded using ambient AI listening technology and understand that they can each withdraw their consent to this listening technology at any point by asking the clinician to turn off or pause the recording:    Patient name: Ness Aguilar  Additional names:           HPI  Chief Complaint   Patient presents with    Weight Gain     Discuss weight loss options        History of Present Illness  Ness Aguilar is a 39 year old female with polycystic ovary syndrome who presents with concerns about weight loss and high blood pressure.    She has a history of polycystic ovary syndrome (PCOS) diagnosed by a fertility specialist. Metformin was previously effective for weight management during fertility treatments but was not effective when tried again last summer. She has not used any weight loss medications recently and has not undergone any weight loss surgeries. She has visited a weight loss clinic in the past but does not recall receiving any medications from her.  They worked on diet with her.  She states she used to lose weight with diet but she has been unable for quite some time now, especially after the birth of her child 2 years ago.    She experienced high blood pressure during her pregnancy, which she attributes to stress from her mother-in-law's passing. She has not been on any blood pressure medications since then.    She reports symptoms suggestive of possible sleep apnea, including snoring and waking up tired. No difficulty falling asleep easily as a passenger in a car.    She is , does not smoke, and works as a teacher for sixth, seventh, and eighth grades. She has a two-year-old child.    Wt Readings from Last 6 Encounters:   07/08/25 266 lb (120.7 kg)   02/21/22 223 lb 12.8 oz (101.5 kg)   02/18/22 225 lb 6.4 oz (102.2 kg)   08/02/21 244 lb (110.7 kg)   01/04/21 262 lb (118.8 kg)    06/11/20 263 lb (119.3 kg)       BMI Readings from Last 6 Encounters:   07/08/25 48.65 kg/m²   02/21/22 47.59 kg/m²   02/18/22 47.93 kg/m²   08/02/21 51.89 kg/m²   01/04/21 55.71 kg/m²   06/11/20 54.97 kg/m²       BP Readings from Last 6 Encounters:   07/08/25 (!) 164/114   02/21/22 132/90   02/18/22 130/89   08/02/21 120/84   01/04/21 138/86   06/11/20 118/76       Results      Review of Systems  No exertional cardiac chest pain or shortness of breath unless stated in HPI which would take precedence.  See HPI for further review of systems.        Medical History:      Past Medical History:    Morbid obesity with BMI of 45.0-49.9, adult (HCC)    Nephrolithiasis    per NG: passed on own- 04/2010       Past Surgical History:   Procedure Laterality Date    Laparoscopic cholecystectomy  11/2008    per NG: cholelithiasis    Removal of ovarian cyst(s)  2009    per NG: ovarian cyst; per [employer]:  \"laparoscopic ovarian cystectomy\"    Tonsillectomy            Current Outpatient Medications   Medication Sig Dispense Refill    metFORMIN 500 MG Oral Tab Take 1 tablet (500 mg total) by mouth 2 (two) times daily with meals. (Patient not taking: Reported on 7/8/2025) 180 tablet 1     Allergies:No Known Allergies     Physical Exam:       Physical Exam  Blood pressure (!) 164/114, pulse 106, temperature 97.8 °F (36.6 °C), temperature source Temporal, height 5' 2\" (1.575 m), weight 266 lb (120.7 kg), last menstrual period 07/04/2025, not currently breastfeeding.  Vitals:    07/08/25 0949 07/08/25 1001   BP: (!) 164/114 (!) 161/103   Pulse: 106    Temp: 97.8 °F (36.6 °C)    TempSrc: Temporal    Weight: 266 lb (120.7 kg)    Height: 5' 2\" (1.575 m)         Physical Exam   Constitutional: Patient is oriented to person, place, and time. Patient appears well-developed and well-nourished. No distress.   HENT:   Head: Normocephalic and atraumatic.   Neck: Normal range of motion. Neck supple. No thyromegaly present.   Lymphadenopathy:      Has no cervical adenopathy.   Cardiovascular: Mildly tachycardic, regular rhythm and no murmur heard.  Pulmonary/Chest: Effort normal and breath sounds normal. No respiratory distress.   Neurological: Patient is alert and oriented to person, place, and time.   Skin: Skin is warm and dry.  No pallor or diaphoresis.  Psychiatric: Patient has a normal mood and affect.   Legs: No pitting edema.    Nursing note and vitals reviewed.      Physical Exam  VITALS: P- 106, BP- 161/103  NECK: Neck supple, no masses.  CHEST: Clear to auscultation bilaterally.  CARDIOVASCULAR: Normal heart rate and rhythm, S1 and S2 normal.  EXTREMITIES: No edema.      Assessment/Plan:        Diagnoses and all orders for this visit:    Morbid obesity with BMI of 45.0-49.9, adult (HCC)  -     CBC With Differential With Platelet; Future  -     Comp Metabolic Panel (14); Future  -     EKG 12 Lead; Future  -     TSH W Reflex To Free T4; Future  -     Hemoglobin A1C; Future  -     Insulin; Future  -     Leptin, Serum; Future  -     OP [facility] ALT REFERRAL HOME SLEEP APNEA TEST  Needs a sleep study test.  I explained the importance of this.  We will do quite a bit of labs.  Blood pressure is also quite high and we need to treat this.  I wrote for Zestoretic and explained its benefits.  Polycystic ovaries  -     CBC With Differential With Platelet; Future  -     Comp Metabolic Panel (14); Future  -     EKG 12 Lead; Future  -     TSH W Reflex To Free T4; Future  -     Hemoglobin A1C; Future  -     Insulin; Future  -     Leptin, Serum; Future  -     OP [facility] ALT REFERRAL HOME SLEEP APNEA TEST    Snoring  -     OP [facility] ALT REFERRAL HOME SLEEP APNEA TEST    Unrefreshed by sleep  -     OP [facility] ALT REFERRAL HOME SLEEP APNEA TEST    Essential hypertension  -     Urinalysis with Culture Reflex; Future  -     lisinopril-hydroCHLOROthiazide 20-12.5 MG Oral Tab; Take 1 tablet by mouth daily. For high blood pressure        Referrals (if applicable)  Orders Placed This  Encounter   Procedures    OP [facility] ALT REFERRAL HOME SLEEP APNEA TEST     Referral Priority:   Routine     Referral Type:   Sleep Study         Follow up if symptoms persist.  Take medicine (if given) as prescribed.  Approach to treatment discussed and patient/family member understands and agrees to plan.     No follow-ups on file.      Assessment & Plan  Hypertension  Elevated blood pressure at 161/103 mmHg, with a history of gestational hypertension attributed to stress. Current readings necessitate treatment to prevent complications and facilitate potential future weight loss interventions. Lisinopril hydrochlorothiazide is chosen for its dual action of lowering blood pressure and reducing fluid retention, with minimal side effects. Blood pressure control is essential before considering weight loss medications.  - Prescribe lisinopril hydrochlorothiazide once daily.  - Order urinalysis to check for proteinuria.  - Perform EKG to assess cardiac status.    Obstructive Sleep Apnea (suspected)  Reports of snoring and waking up tired suggest sleep apnea. Untreated sleep apnea can contribute to weight gain and hypertension. A home sleep apnea test is recommended for diagnosis.  - Order home sleep apnea test.    Polycystic Ovary Syndrome (PCOS)  Confirmed diagnosis of PCOS by a fertility specialist. PCOS can contribute to weight gain and difficulty losing weight. Blood tests are necessary to assess metabolic status and guide further management.  - Order blood tests including thyroid function, hemoglobin A1c, insulin, and leptin levels to assess metabolic status.    General Health Maintenance  Concerns about weight loss difficulties and previous use of metformin for weight management during fertility treatment. Evaluation for diabetes is necessary as it may influence weight management strategies and potential use of weight loss medications.  - Evaluate for diabetes as it may influence weight management  strategies.    Follow-up  Follow-up is required to assess the effectiveness of hypertension treatment and to review test results. Further management will be based on these findings.  - Follow up after receiving test results to discuss further management.    Eliseo Mata DO  7/8/2025

## (undated) NOTE — LETTER
12/31/2018              Ness Lozano        [address] [address]         Dear Daja Santana,    This letter is to inform you that our office has made several attempts to reach you by phone without success.   We were attempting to contact you

## (undated) NOTE — Clinical Note
Hello, thank you very much for referring your patient to our clinic. I have recommended intensive lifestyle and behavioral modifications, as well as phentermine low dose for weight loss. I will keep you updated on patient's progress.  Please let me know if

## (undated) NOTE — MR AVS SNAPSHOT
[address], [address]  [address] [facility]  [phone]               Thank you for choosing us for your health care visit with Balaji Jaffe DO.   We are glad to serve you and happy to provide you with this summary Summaries. If you've been to the Emergency Department or your doctor's office, you can view your past visit information in [employer] by going to Visits < Visit Summaries. [employer] questions? Call [phone] for help.   [employer] is NOT to be used for urge

## (undated) NOTE — LETTER
4/19/2019              Ness Lozano        [address] [address]         Dear Chio Shin records indicate that the blood test and ultrasound ordered for you by Austin Lieberman MD  have not been done.   If you have, in fact, alread

## (undated) NOTE — MR AVS SNAPSHOT
[address]  [address] [facility]  [phone]               Thank you for choosing us for your health care visit with Katerin Barros DO.   We are glad to serve you and happy to provide you with this summary If you've recently had a stay at the Hospital you can access your discharge instructions in [employer] by going to Visits < Admission Summaries.  If you've been to the Emergency Department or your doctor's office, you can view your past visit information in My

## (undated) NOTE — Clinical Note
Thank you for the consult. I saw Ms. Lozano in the endocrine/diabetes clinic today. Please see attached my note. Please feel free to contact me with any questions. Thanks!